# Patient Record
Sex: MALE | Race: WHITE
[De-identification: names, ages, dates, MRNs, and addresses within clinical notes are randomized per-mention and may not be internally consistent; named-entity substitution may affect disease eponyms.]

---

## 2019-02-13 NOTE — EDM.PDOC
ED HPI GENERAL MEDICAL PROBLEM





- General


Chief Complaint: General


Stated Complaint: [facility]


Time Seen by Provider: 02/13/19 16:04


Source of Information: Reports: Patient, EMS


History Limitations: Reports: No Limitations





- History of Present Illness


INITIAL COMMENTS - FREE TEXT/NARRATIVE: 





The patient presents by [facility] for shortness of breath, diaphoresis

, tingling in both arms and a headache.  He works in the oil field checking 

doForms.  He was at a well sight near [facility].  He was doing some 

maintenance and got sweaty more then normal and was winded.  He started driving 

to another well sight and started having tingling in his arms.  He also felt 

lightheaded at that time.  He had to drive awhile to get cell service and then 

he called his boss and 911.  [facility] brought him in.  He feels much 

better now.  He just has some tingling in both hands.  He has a chronic 

headache.  He denies having any chest pain, nausea, vomiting or abdominal pain.

  He has no history of PE or DVT.  He has no edema or pain in his legs.


Onset: Gradual


Duration: Hour(s):


Severity: Moderate


Improves with: Reports: None


Worsens with: Reports: None


Associated Symptoms: Reports: Headaches, Shortness of Breath.  Denies: Chest 

Pain, Cough, Fever/Chills, Nausea/Vomiting





- Related Data


 Allergies











Allergy/AdvReac Type Severity Reaction Status Date / Time


 


No Known Allergies Allergy   Verified 02/13/19 15:59











Home Meds: 


 Home Meds





Multivitamin [Multivitamins] 1 tab PO DAILY 02/13/19 [History]


Omeprazole 20 mg PO DAILY 02/13/19 [History]











Past Medical History


Gastrointestinal History: Reports: GERD, Other (See Below)


Other Gastrointestinal History: blood in stool





- Past Surgical History


GI Surgical History: Reports: Colonoscopy


Male  Surgical History: Reports: Vasectomy, Other (See Below)


Other Male  Surgeries/Procedures: vasectomy reversal





Social & Family History





- Tobacco Use


Smoking Status *Q: Never Smoker


Second Hand Smoke Exposure: No





- Caffeine Use


Caffeine Use: Reports: Soda





- Recreational Drug Use


Recreational Drug Use: No





ED ROS GENERAL





- Review of Systems


Review Of Systems: See Below


Constitutional: Reports: No Symptoms


HEENT: Reports: No Symptoms


Respiratory: Reports: Shortness of Breath.  Denies: Cough


Cardiovascular: Reports: Lightheadedness.  Denies: Chest Pain


Endocrine: Reports: No Symptoms


GI/Abdominal: Reports: No Symptoms


: Reports: No Symptoms


Musculoskeletal: Reports: No Symptoms


Skin: Reports: No Symptoms


Neurological: Reports: Headache





ED EXAM, GENERAL





- Physical Exam


Exam: See Below


Exam Limited By: No Limitations


General Appearance: Alert, No Apparent Distress


Ears: Normal External Exam


Nose: Normal Inspection


Head: Atraumatic, Normocephalic


Neck: Normal Inspection


Respiratory/Chest: No Respiratory Distress, Lungs Clear, Normal Breath Sounds


Cardiovascular: Regular Rate, Rhythm, No Edema, No Murmur


GI/Abdominal: Soft, Non-Tender, No Organomegaly, No Mass


Back Exam: Normal Inspection


Extremities: Normal Inspection





EKG INTERPRETATION


EKG Date: 02/13/19


Time: 16:33


Rhythm: NSR


Rate (Beats/Min): 83


Axis: Normal


P-Wave: Present


QRS: Normal


ST-T: Normal


QT: Normal





Course





- Vital Signs


Last Recorded V/S: 


 Last Vital Signs











Temp  98.3 F   02/13/19 15:53


 


Pulse  86   02/13/19 15:53


 


Resp  16   02/13/19 15:53


 


BP  137/83   02/13/19 15:53


 


Pulse Ox  99   02/13/19 15:53














- Orders/Labs/Meds


Orders: 


 Active Orders 24 hr











 Category Date Time Status


 


 Cardiac Monitoring [RC] .AS DIRECTED Care  02/13/19 16:24 Active


 


 EKG Documentation Completion [RC] STAT Care  02/13/19 16:25 Active


 


 Peripheral IV Care [RC] .AS DIRECTED Care  02/13/19 16:25 Active


 


 Sodium Chloride 0.9% [Saline Flush] Med  02/13/19 16:24 Active





 10 ml FLUSH ASDIRECTED PRN   


 


 Peripheral IV Insertion Adult [OM.PC] Stat Oth  02/13/19 16:24 Ordered








 Medication Orders





Sodium Chloride (Saline Flush)  10 ml FLUSH ASDIRECTED PRN


   PRN Reason: Keep Vein Open


   Last Admin: 02/13/19 16:30  Dose: 10 ml








Labs: 


 Laboratory Tests











  02/13/19 02/13/19 02/13/19 Range/Units





  17:00 17:00 17:00 


 


WBC  6.58    (4.23-9.07)  K/mm3


 


RBC  4.58 L    (4.63-6.08)  M/mm3


 


Hgb  14.8    (13.7-17.5)  gm/L


 


Hct  40.9    (40.1-51.0)  %


 


MCV  89.3    (79.0-92.2)  fl


 


MCH  32.3 H    (25.7-32.2)  pg


 


MCHC  36.2 H    (32.2-35.5)  g/dl


 


RDW Std Deviation  43.5    (35.1-43.9)  fL


 


Plt Count  233    (163-337)  K/mm3


 


MPV  9.5    (9.4-12.3)  fl


 


Neut % (Auto)  67.6    (34.0-67.9)  %


 


Lymph % (Auto)  23.6    (21.8-53.1)  %


 


Mono % (Auto)  8.2    (5.3-12.2)  %


 


Eos % (Auto)  0.2 L    (0.8-7.0)  


 


Baso % (Auto)  0.2    (0.1-1.2)  %


 


Neut # (Auto)  4.46    (1.78-5.38)  K/mm3


 


Lymph # (Auto)  1.55    (1.32-3.57)  K/mm3


 


Mono # (Auto)  0.54    (0.30-0.82)  K/mm3


 


Eos # (Auto)  0.01 L    (0.04-0.54)  K/mm3


 


Baso # (Auto)  0.01    (0.01-0.08)  K/mm3


 


D-Dimer, Quantitative    < 0.19 L  (0.19-0.50)  mg/L


 


Sodium   140   (136-145)  mEq/L


 


Potassium   3.1 L   (3.5-5.1)  mEq/L


 


Chloride   105   ()  mEq/L


 


Carbon Dioxide   23   (21-32)  mEq/L


 


Anion Gap   15.1 H   (5-15)  


 


BUN   18   (7-18)  mg/dL


 


Creatinine   1.0   (0.7-1.3)  mg/dL


 


Est Cr Clr Drug Dosing   106.53   mL/min


 


Estimated GFR (MDRD)   > 60   (>60)  mL/min


 


BUN/Creatinine Ratio   18.0   (14-18)  


 


Glucose   90   ()  mg/dL


 


Calcium   8.5   (8.5-10.1)  mg/dL


 


Total Bilirubin   0.7   (0.2-1.0)  mg/dL


 


AST   29   (15-37)  U/L


 


ALT   84 H   (16-63)  U/L


 


Alkaline Phosphatase   73   ()  U/L


 


Troponin I   < 0.017   (0.00-0.056)  ng/mL


 


Total Protein   7.2   (6.4-8.2)  g/dl


 


Albumin   4.1   (3.4-5.0)  g/dl


 


Globulin   3.1   gm/dL


 


Albumin/Globulin Ratio   1.3   (1-2)  











Meds: 


Medications











Generic Name Dose Route Start Last Admin





  Trade Name Freq  PRN Reason Stop Dose Admin


 


Sodium Chloride  10 ml  02/13/19 16:24  02/13/19 16:30





  Saline Flush  FLUSH   10 ml





  ASDIRECTED PRN   Administration





  Keep Vein Open   





     





     





     














- Re-Assessments/Exams


Free Text/Narrative Re-Assessment/Exam: 





02/13/19 17:29


I ordered an EKG, CT of his head and labs.


02/13/19 17:57


His CT shows minimal sinus finding which is incidental.  No acute intracranial 

abnormality is identified on noncontrast head CT exam.  His EKG shows a NSR 

with some LVH.  No acute changes noted.  His CBC looks good.  His D-dimer is 

negative.  His troponin is negative.  His K is low at 3.1.  He feels better.  

He says he has chronic headaches from his neck pain.  I will have him follow up 

with Dr Jones at [facility] and talk about getting an MRI.  I would also like to 

have him wear a holter monitor for 48 hours.





Departure





- Departure


Time of Disposition: 18:05


Disposition: Home, Self-Care 01


Condition: Good


Clinical Impression: 


 Lightheaded, Shortness of breath, Tingling of left upper extremity, Tingling 

of right upper extremity








- Discharge Information


*PRESCRIPTION DRUG MONITORING PROGRAM REVIEWED*: Not Applicable


*COPY OF PRESCRIPTION DRUG MONITORING REPORT IN PATIENT LOLIS: Not Applicable


Referrals: 


Chin Barnes MD [Primary Care Provider] - 


Forms:  ED Department Discharge


Additional Instructions: 


Wear the holter monitor for 2 days.   Follow up with your doctor within a week.

  Please return if you are worse.





- My Orders


Last 24 Hours: 


My Active Orders





02/13/19 16:24


Cardiac Monitoring [RC] .AS DIRECTED 


Sodium Chloride 0.9% [Saline Flush]   10 ml FLUSH ASDIRECTED PRN 


Peripheral IV Insertion Adult [OM.PC] Stat 





02/13/19 16:25


EKG Documentation Completion [RC] STAT 


Peripheral IV Care [RC] .AS DIRECTED 














- Assessment/Plan


Last 24 Hours: 


My Active Orders





02/13/19 16:24


Cardiac Monitoring [RC] .AS DIRECTED 


Sodium Chloride 0.9% [Saline Flush]   10 ml FLUSH ASDIRECTED PRN 


Peripheral IV Insertion Adult [OM.PC] Stat 





02/13/19 16:25


EKG Documentation Completion [RC] STAT 


Peripheral IV Care [RC] .AS DIRECTED

## 2019-02-13 NOTE — CT
Head CT

 

Technique: Multiple axial sections through the brain were obtained.  

Intravenous contrast was not utilized.

 

Comparison: No prior intracranial imaging.

 

Findings: Ventricles along with basal cisterns and sulci over the 

convexities are within normal limits for the patient's age.  No 

abnormal parenchymal densities are seen.  No evidence of intracranial 

hemorrhage.  No midline shift or mass effect is seen.

 

Bone window settings were reviewed which shows minimal area of mucosal

 thickening within the left ethmoid sinus which is incidental.  Other 

visualized sinuses are clear.  No acute calvarial abnormality is 

appreciated.

 

Impression:

1.  Minimal sinus finding which is incidental.  No acute intracranial 

abnormality is identified on noncontrast head CT exam.

 

Diagnostic code #2

 

 

4

## 2022-12-19 ENCOUNTER — HOSPITAL ENCOUNTER (OUTPATIENT)
Dept: HOSPITAL 41 - JD.SDS | Age: 47
Discharge: HOME | End: 2022-12-19
Attending: SURGERY
Payer: COMMERCIAL

## 2022-12-19 VITALS — HEART RATE: 56 BPM | SYSTOLIC BLOOD PRESSURE: 126 MMHG | DIASTOLIC BLOOD PRESSURE: 91 MMHG

## 2022-12-19 DIAGNOSIS — F41.9: ICD-10-CM

## 2022-12-19 DIAGNOSIS — G43.909: ICD-10-CM

## 2022-12-19 DIAGNOSIS — K31.89: ICD-10-CM

## 2022-12-19 DIAGNOSIS — Z79.899: ICD-10-CM

## 2022-12-19 DIAGNOSIS — K44.9: ICD-10-CM

## 2022-12-19 DIAGNOSIS — K57.30: ICD-10-CM

## 2022-12-19 DIAGNOSIS — F32.A: ICD-10-CM

## 2022-12-19 DIAGNOSIS — K21.9: ICD-10-CM

## 2022-12-19 DIAGNOSIS — N40.0: ICD-10-CM

## 2022-12-19 DIAGNOSIS — K31.7: Primary | ICD-10-CM

## 2022-12-19 PROCEDURE — 43239 EGD BIOPSY SINGLE/MULTIPLE: CPT
